# Patient Record
Sex: MALE | Race: BLACK OR AFRICAN AMERICAN | Employment: OTHER | URBAN - METROPOLITAN AREA
[De-identification: names, ages, dates, MRNs, and addresses within clinical notes are randomized per-mention and may not be internally consistent; named-entity substitution may affect disease eponyms.]

---

## 2020-06-01 ENCOUNTER — APPOINTMENT (OUTPATIENT)
Dept: GENERAL RADIOLOGY | Age: 50
End: 2020-06-01
Payer: OTHER MISCELLANEOUS

## 2020-06-01 ENCOUNTER — HOSPITAL ENCOUNTER (EMERGENCY)
Age: 50
Discharge: HOME | End: 2020-06-01
Payer: OTHER MISCELLANEOUS

## 2020-06-01 ENCOUNTER — APPOINTMENT (OUTPATIENT)
Dept: CT IMAGING | Age: 50
End: 2020-06-01
Payer: OTHER MISCELLANEOUS

## 2020-06-01 VITALS
HEART RATE: 74 BPM | HEIGHT: 77 IN | DIASTOLIC BLOOD PRESSURE: 93 MMHG | WEIGHT: 315 LBS | BODY MASS INDEX: 37.19 KG/M2 | OXYGEN SATURATION: 99 % | RESPIRATION RATE: 16 BRPM | TEMPERATURE: 97.4 F | SYSTOLIC BLOOD PRESSURE: 137 MMHG

## 2020-06-01 PROCEDURE — 70486 CT MAXILLOFACIAL W/O DYE: CPT

## 2020-06-01 PROCEDURE — 6370000000 HC RX 637 (ALT 250 FOR IP): Performed by: PHYSICIAN ASSISTANT

## 2020-06-01 PROCEDURE — 72110 X-RAY EXAM L-2 SPINE 4/>VWS: CPT

## 2020-06-01 PROCEDURE — 72125 CT NECK SPINE W/O DYE: CPT

## 2020-06-01 PROCEDURE — 70450 CT HEAD/BRAIN W/O DYE: CPT

## 2020-06-01 PROCEDURE — 99284 EMERGENCY DEPT VISIT MOD MDM: CPT

## 2020-06-01 RX ORDER — ACETAMINOPHEN 500 MG
1000 TABLET ORAL ONCE
Status: COMPLETED | OUTPATIENT
Start: 2020-06-01 | End: 2020-06-01

## 2020-06-01 RX ORDER — CYCLOBENZAPRINE HCL 10 MG
10 TABLET ORAL 3 TIMES DAILY PRN
Qty: 21 TABLET | Refills: 0 | Status: SHIPPED | OUTPATIENT
Start: 2020-06-01 | End: 2020-06-11

## 2020-06-01 RX ORDER — CAPSAICIN 0.025 %
CREAM (GRAM) TOPICAL
Qty: 1 TUBE | Refills: 1 | Status: SHIPPED | OUTPATIENT
Start: 2020-06-01 | End: 2020-07-01

## 2020-06-01 RX ORDER — NAPROXEN 500 MG/1
500 TABLET ORAL 2 TIMES DAILY WITH MEALS
Qty: 14 TABLET | Refills: 0 | Status: SHIPPED | OUTPATIENT
Start: 2020-06-01 | End: 2020-06-08

## 2020-06-01 RX ADMIN — ACETAMINOPHEN 1000 MG: 500 TABLET ORAL at 18:17

## 2020-06-01 ASSESSMENT — PAIN DESCRIPTION - FREQUENCY: FREQUENCY: CONTINUOUS

## 2020-06-01 ASSESSMENT — PAIN DESCRIPTION - DIRECTION: RADIATING_TOWARDS: NECK AND HEAD

## 2020-06-01 ASSESSMENT — PAIN DESCRIPTION - PAIN TYPE: TYPE: ACUTE PAIN

## 2020-06-01 ASSESSMENT — PAIN SCALES - GENERAL
PAINLEVEL_OUTOF10: 9
PAINLEVEL_OUTOF10: 9

## 2020-06-01 ASSESSMENT — PAIN DESCRIPTION - LOCATION: LOCATION: BACK

## 2020-06-01 ASSESSMENT — ENCOUNTER SYMPTOMS
GASTROINTESTINAL NEGATIVE: 1
EYES NEGATIVE: 1
RESPIRATORY NEGATIVE: 1
BACK PAIN: 1

## 2020-06-01 ASSESSMENT — PAIN DESCRIPTION - ONSET: ONSET: ON-GOING

## 2020-06-01 ASSESSMENT — PAIN DESCRIPTION - ORIENTATION: ORIENTATION: LOWER

## 2020-06-01 ASSESSMENT — PAIN DESCRIPTION - DESCRIPTORS: DESCRIPTORS: SHARP

## 2020-06-01 NOTE — ED NOTES
Patient resting in bed with no s/s of distress. Respirations even and unlabored.         Kristen Lugo RN  06/01/20 1800

## 2020-06-01 NOTE — ED PROVIDER NOTES
There is no abdominal tenderness. Musculoskeletal: Normal range of motion. Lumbar back: He exhibits tenderness. Back:    Skin:     General: Skin is warm and dry. Findings: No erythema or rash. Neurological:      Mental Status: He is alert and oriented to person, place, and time. Cranial Nerves: No cranial nerve deficit. Psychiatric:         Judgment: Judgment normal.           All other labs were within normal range or not returned as of this dictation. EMERGENCY DEPARTMENT COURSE and DIFFERENTIALDIAGNOSIS/MDM:   Vitals:    Vitals:    06/01/20 1616 06/01/20 1745   BP: (!) 137/93 (!) 137/93   Pulse: 90 74   Resp: 16    Temp: 97.4 °F (36.3 °C)    TempSrc: Oral    SpO2: 99%    Weight: (!) 365 lb (165.6 kg)    Height: 6' 5\" (1.956 m)         CT head, neck, facial bones are all negative for acute fracture. Lumbar spine x-rays negative for acute abnormality. Patient given Tylenol for discomfort while in the emergency room. Patient placed on Naprosyn, Flexeril, and capsaicin topical for treatment at home. Follow-up with primary care provider for reevaluation and treatment. Return here if symptoms worsen or if new concerning symptoms arise. Patient verbalized understand plan at discharge and has no further questions. PROCEDURES:  Unless otherwise noted below, none     Procedures      FINAL IMPRESSION      1. Closed head injury, initial encounter    2. Whiplash injuries, initial encounter    3. Facial contusion, initial encounter    4.  Lumbar sprain, initial encounter          DISPOSITION/PLAN   DISPOSITION Decision To Discharge 06/01/2020 06:07:47 PM          Binu Carlson PA-C (electronically signed)  Attending Emergency Physician  [address] [place] [address] Gabi Avery PA-C  06/01/20 2022

## 2025-02-28 ENCOUNTER — HOSPITAL ENCOUNTER (OUTPATIENT)
Dept: RADIOLOGY | Facility: HOSPITAL | Age: 55
Discharge: HOME | End: 2025-02-28

## 2025-02-28 DIAGNOSIS — Z13.6 ENCOUNTER FOR SCREENING FOR CARDIOVASCULAR DISORDERS: ICD-10-CM

## 2025-02-28 PROCEDURE — 75571 CT HRT W/O DYE W/CA TEST: CPT

## 2025-06-21 ENCOUNTER — HOSPITAL ENCOUNTER (EMERGENCY)
Facility: HOSPITAL | Age: 55
Discharge: HOME | End: 2025-06-21
Payer: COMMERCIAL

## 2025-06-21 VITALS
BODY MASS INDEX: 37.19 KG/M2 | SYSTOLIC BLOOD PRESSURE: 185 MMHG | HEIGHT: 77 IN | DIASTOLIC BLOOD PRESSURE: 99 MMHG | WEIGHT: 315 LBS | TEMPERATURE: 97.2 F | HEART RATE: 83 BPM | RESPIRATION RATE: 15 BRPM | OXYGEN SATURATION: 95 %

## 2025-06-21 DIAGNOSIS — J02.9 VIRAL PHARYNGITIS: ICD-10-CM

## 2025-06-21 DIAGNOSIS — H92.01 RIGHT EAR PAIN: Primary | ICD-10-CM

## 2025-06-21 LAB — S PYO DNA THROAT QL NAA+PROBE: NOT DETECTED

## 2025-06-21 PROCEDURE — 87651 STREP A DNA AMP PROBE: CPT

## 2025-06-21 PROCEDURE — 99284 EMERGENCY DEPT VISIT MOD MDM: CPT

## 2025-06-21 PROCEDURE — 96372 THER/PROPH/DIAG INJ SC/IM: CPT

## 2025-06-21 PROCEDURE — 2500000004 HC RX 250 GENERAL PHARMACY W/ HCPCS (ALT 636 FOR OP/ED): Mod: JZ

## 2025-06-21 RX ORDER — AMOXICILLIN 500 MG/1
500 CAPSULE ORAL EVERY 12 HOURS SCHEDULED
Qty: 14 CAPSULE | Refills: 0 | Status: SHIPPED | OUTPATIENT
Start: 2025-06-21 | End: 2025-06-28

## 2025-06-21 RX ORDER — KETOROLAC TROMETHAMINE 30 MG/ML
30 INJECTION, SOLUTION INTRAMUSCULAR; INTRAVENOUS ONCE
Status: COMPLETED | OUTPATIENT
Start: 2025-06-21 | End: 2025-06-21

## 2025-06-21 RX ORDER — BENZOCAINE AND MENTHOL 15; 3.6 MG/1; MG/1
1 LOZENGE ORAL EVERY 2 HOUR PRN
Qty: 42 LOZENGE | Refills: 0 | Status: SHIPPED | OUTPATIENT
Start: 2025-06-21 | End: 2025-06-26

## 2025-06-21 RX ADMIN — KETOROLAC TROMETHAMINE 30 MG: 30 INJECTION, SOLUTION INTRAMUSCULAR at 03:16

## 2025-06-21 ASSESSMENT — PAIN SCALES - GENERAL: PAINLEVEL_OUTOF10: 10 - WORST POSSIBLE PAIN

## 2025-06-21 ASSESSMENT — PAIN DESCRIPTION - FREQUENCY: FREQUENCY: CONSTANT/CONTINUOUS

## 2025-06-21 ASSESSMENT — PAIN - FUNCTIONAL ASSESSMENT: PAIN_FUNCTIONAL_ASSESSMENT: 0-10

## 2025-06-21 ASSESSMENT — PAIN DESCRIPTION - PAIN TYPE: TYPE: ACUTE PAIN

## 2025-06-21 ASSESSMENT — PAIN DESCRIPTION - DESCRIPTORS: DESCRIPTORS: SORE

## 2025-06-21 ASSESSMENT — PAIN DESCRIPTION - LOCATION: LOCATION: THROAT

## 2025-06-21 NOTE — ED PROVIDER NOTES
HPI   Chief Complaint   Patient presents with    Sore Throat     Sore throat and ear pain started today       History provided by: Patient    Limitations to history: None    CC: Sore throat    HPI: 55-year-old male with history of hypertension presents the emergency department to be evaluated for sore throat and earache.  Patient states that he woke up this morning with a right-sided earache and sore throat.  Denies drooling or changes to his voice.  Denies cough, nose, congestion.  Denies chest pain or shortness of breath.  Denies fever and chills.  Denies he has taken over-the-counter medications for his symptoms.  Denies headache and neck pain.  Denies all other systemic symptoms.    ROS: Negative unless mentioned in HPI    Medical Hx: Allergies reviewed.  Immunizations are up-to-date.    Physical exam:    Constitutional: Sitting comfortably in the room and in no distress.  Oriented to person, place, time, and situation.    HEENT: Head is normocephalic, atraumatic. Patient's airway is patent.   patient's tympanic membrane is slightly erythematous and edematous.  No mastoid abnormalities.  Nasal mucosa clear.  Mouth with normal mucosa.  Throat is erythematous and there are no oropharyngeal exudates, uvula is midline.  No obvious facial deformities.  No drooling or tripoding.  No muffled or hoarse voice.  No nuchal rigidity or meningeal signs.    Eyes: Clear bilaterally.  Pupils are equal round and reactive to light and accommodation.  Extraocular movements intact.      Cardiac: Regular rate, regular rhythm.  Heart sounds S1, S2.  No murmurs, rubs, or gallops.  PMI nondisplaced.  No JVD.    Respiratory: Regular respiratory rate and effort.  Breath sounds are clear and equal bilaterally, no adventitious lung sounds.  Patient is speaking in full sentences and is in no apparent respiratory distress. No use of accessory muscles.      Gastrointestinal: Abdomen is soft, nondistended, and nontender.  There are no obvious  deformities.  No rebound tenderness or guarding.  Bowel sounds are normal active.    Genitourinary: No CVA or flank tenderness.    Musculoskeletal: No reproducible tenderness.  No obvious skin or bony deformities.  Patient has equal range of motion in all extremities and no strength deficiencies.  No muscle or joint tenderness. No back or neck tenderness.  Capillary refill less than 3 seconds.  Strong peripheral pulses.  No sensory deficits.    Neurological: Patient is alert and oriented.  No focal deficits.  5/5 strength in all extremities.  Cranial nerves II through XII intact. GCS15.     Skin: Skin is normal color for race and is warm, dry, and intact.  No evidence of trauma.  No lesions, rashes, bruising, jaundice, or masses.    Psych: Appropriate mood and affect.  No apparent risk to self or others.    Heme/lymph: No adenopathy, lymphadenopathy, or splenomegaly    Physical exam is otherwise negative unless stated above or in history of present illness.              Patient History   Medical History[1]  Surgical History[2]  Family History[3]  Social History[4]    Physical Exam   ED Triage Vitals [06/21/25 0133]   Temperature Heart Rate Respirations BP   36.2 °C (97.2 °F) 83 15 (!) 185/99      Pulse Ox Temp Source Heart Rate Source Patient Position   95 % Temporal Monitor Sitting      BP Location FiO2 (%)     Right arm --       Physical Exam      ED Course & MDM   Diagnoses as of 06/21/25 0254   Right ear pain   Viral pharyngitis   Patient updated on plan for lab testing, IV insertion, radiology imaging, and medications to be administered while in the ER (if indicated). Patient updated on expected wait times for testing and results. Patient provided my name and told to ask any staff member for questions or concerns if they should arise. Electronic medical record reviewed.     [facility]    Patient presented to the emergency department with the chief complaint sore throat and earache.  Head is normocephalic, atraumatic.  Patient's airway is patent.   patient's tympanic membrane is slightly erythematous and edematous.  No mastoid abnormalities.  Nasal mucosa clear.  Mouth with normal mucosa.  Throat is erythematous and there are no oropharyngeal exudates, uvula is midline.  No obvious facial deformities.  No drooling or tripoding.  No muffled or hoarse voice.  No nuchal rigidity or meningeal signs.  Examination of the heart and lungs are unremarkable.  On arrival to the emergency department, vital signs were within normal limits    Will obtain a strep PCR swab    Patient tested negative for strep, likely experiencing a viral syndrome.  Patient will be discharged with Cepacol cough drops.  Discussed ibuprofen and Tylenol for discomfort.  If he continues to have the earache for the next few days I will start him on oral amoxicillin however I did let him know that he is likely having a viral otalgia.  He will follow-up with his primary care provider.  All questions and concerns addressed.  Reasons to return to ER discussed.  Patient verbalized understanding and agreement with the treatment plan and they remained hemodynamically stable in the ER.      This note was dictated using a speech recognition program.  While an attempt was made at proof-reading to minimize errors, minor errors in transcription may be present              No data recorded     [place] Coma Scale Score: 15 (06/21/25 0130 : Meredith Denis RN)                           Medical Decision Making      Procedure  Procedures       [1]   Past Medical History:  Diagnosis Date    Anesthesia of skin 04/21/2020    Numbness and tingling    Personal history of other diseases of the circulatory system 04/21/2020    History of essential hypertension    Personal history of other diseases of the nervous system and sense organs 04/21/2020    History of eye problem    Personal history of other endocrine, nutritional and metabolic disease 04/21/2020    History of diabetes mellitus    [2]   Past Surgical History:  Procedure Laterality Date    US ASPIRATION INJECTION INTERMEDIATE JOINT  4/21/2020    US ASPIRATION INJECTION INTERMEDIATE JOINT 4/21/2020 [facility]   [3] No family history on file.  [4]   Social History  Tobacco Use    Smoking status: Not on file    Smokeless tobacco: Not on file   Substance Use Topics    Alcohol use: Not on file    Drug use: Not on file        Josiah Guzman PA-C  06/21/25 0254